# Patient Record
Sex: MALE | Race: WHITE
[De-identification: names, ages, dates, MRNs, and addresses within clinical notes are randomized per-mention and may not be internally consistent; named-entity substitution may affect disease eponyms.]

---

## 2018-01-16 ENCOUNTER — HOSPITAL ENCOUNTER (OUTPATIENT)
Dept: HOSPITAL 47 - RADXRMAIN | Age: 53
Discharge: HOME | End: 2018-01-16
Attending: FAMILY MEDICINE
Payer: COMMERCIAL

## 2018-01-16 DIAGNOSIS — S89.92XA: Primary | ICD-10-CM

## 2018-01-16 NOTE — XR
EXAMINATION TYPE: XR knee limited LT

 

DATE OF EXAM: 1/16/2018

 

CLINICAL HISTORY: pain

 

TECHNIQUE:  Two views of the left knee are obtained.

 

COMPARISON: None.

 

FINDINGS:  There is no acute fracture/dislocation.  The tri-compartment joint spaces appear within no
rmal limits.  Bony exostosis suspected arising from the medial right supracondylar region of the femu
r. The overlying soft tissue appears unremarkable. Small joint effusion noted.

 

IMPRESSION:  

There is no acute fracture or dislocation. Suspect exostosis.

 

 

ICD 10 NO FRACTURE, INITIAL EVALUATION

## 2018-10-29 ENCOUNTER — HOSPITAL ENCOUNTER (OUTPATIENT)
Dept: HOSPITAL 47 - RADNMMAIN | Age: 53
Discharge: HOME | End: 2018-10-29
Payer: OTHER GOVERNMENT

## 2018-10-29 DIAGNOSIS — R94.31: Primary | ICD-10-CM

## 2018-10-29 PROCEDURE — 93017 CV STRESS TEST TRACING ONLY: CPT

## 2018-10-29 NOTE — EST
EXERCISE STRESS



AGE:

53



SEX:

M



HT:

70"



WT:

278



PROTOCOL:

Marty stress test



STAGE:

1



DURATION OF EXERCISE:

9:00



HEART RATE REST:

86



BLOOD PRESSURE REST:

148/72



MAXIMUM HEART RATE ACHIEVED:

147



MAXIMUM BLOOD PRESSURE:

212/85



85% MPHR:

140



100% MPHR:

165



METS:

10.5



INDICATIONS:

Chest pain.



CLINICAL INFORMATION:

Baseline EKG shows sinus rhythm with poor R-wave progression.  Patient exercised on

Marty protocol for a total of 9 minutes achieving 10 METS, 89% of predicted maximal

heart rate without chest pain or diagnostic ST-segment depression.



CONCLUSION:

1. Good exercise tolerance.

2. Negative stress test by EKG criteria.

3. Abnormal baseline EKGs suggestive of prior anteroseptal myocardial infarction.





MMODL / IJN: [id number] / Job#: [id number]